# Patient Record
Sex: FEMALE | Race: WHITE | NOT HISPANIC OR LATINO | URBAN - METROPOLITAN AREA
[De-identification: names, ages, dates, MRNs, and addresses within clinical notes are randomized per-mention and may not be internally consistent; named-entity substitution may affect disease eponyms.]

---

## 2018-03-12 ENCOUNTER — OFFICE VISIT CONVERTED (OUTPATIENT)
Dept: ORTHOPEDIC SURGERY | Facility: CLINIC | Age: 33
End: 2018-03-12
Attending: ORTHOPAEDIC SURGERY

## 2018-03-26 ENCOUNTER — OFFICE VISIT CONVERTED (OUTPATIENT)
Dept: ORTHOPEDIC SURGERY | Facility: CLINIC | Age: 33
End: 2018-03-26
Attending: ORTHOPAEDIC SURGERY

## 2018-04-11 ENCOUNTER — OFFICE VISIT CONVERTED (OUTPATIENT)
Dept: ORTHOPEDIC SURGERY | Facility: CLINIC | Age: 33
End: 2018-04-11
Attending: ORTHOPAEDIC SURGERY

## 2018-05-02 ENCOUNTER — OFFICE VISIT CONVERTED (OUTPATIENT)
Dept: ORTHOPEDIC SURGERY | Facility: CLINIC | Age: 33
End: 2018-05-02
Attending: PHYSICIAN ASSISTANT

## 2021-04-05 ENCOUNTER — HOSPITAL ENCOUNTER (OUTPATIENT)
Dept: LAB | Facility: HOSPITAL | Age: 36
Discharge: HOME | End: 2021-04-05
Attending: PHYSICIAN ASSISTANT

## 2021-04-05 LAB
25(OH)D3 SERPL-MCNC: 22.1 NG/ML (ref 30–100)
ALBUMIN SERPL-MCNC: 4.3 G/DL (ref 3.5–5)
ALBUMIN/GLOB SERPL: 1.5 {RATIO} (ref 1.4–2.6)
ALP SERPL-CCNC: 39 U/L (ref 42–98)
ALT SERPL-CCNC: 9 U/L (ref 10–40)
ANION GAP SERPL CALC-SCNC: 15 MMOL/L (ref 8–19)
AST SERPL-CCNC: 12 U/L (ref 15–50)
BASOPHILS # BLD AUTO: 0.03 10*3/UL (ref 0–0.2)
BASOPHILS NFR BLD AUTO: 0.6 % (ref 0–3)
BILIRUB SERPL-MCNC: 0.94 MG/DL (ref 0.2–1.3)
BUN SERPL-MCNC: 11 MG/DL (ref 5–25)
BUN/CREAT SERPL: 13 {RATIO} (ref 6–20)
CALCIUM SERPL-MCNC: 9 MG/DL (ref 8.7–10.4)
CHLORIDE SERPL-SCNC: 107 MMOL/L (ref 99–111)
CHOLEST SERPL-MCNC: 159 MG/DL (ref 107–200)
CHOLEST/HDLC SERPL: 3.1 {RATIO} (ref 3–6)
CONV ABS IMM GRAN: 0.02 10*3/UL (ref 0–0.2)
CONV CO2: 20 MMOL/L (ref 22–32)
CONV IMMATURE GRAN: 0.4 % (ref 0–1.8)
CONV TOTAL PROTEIN: 7.2 G/DL (ref 6.3–8.2)
CREAT UR-MCNC: 0.82 MG/DL (ref 0.5–0.9)
DEPRECATED RDW RBC AUTO: 42.5 FL (ref 36.4–46.3)
EOSINOPHIL # BLD AUTO: 0.15 10*3/UL (ref 0–0.7)
EOSINOPHIL # BLD AUTO: 2.8 % (ref 0–7)
ERYTHROCYTE [DISTWIDTH] IN BLOOD BY AUTOMATED COUNT: 12.8 % (ref 11.7–14.4)
GFR SERPLBLD BASED ON 1.73 SQ M-ARVRAT: >60 ML/MIN/{1.73_M2}
GLOBULIN UR ELPH-MCNC: 2.9 G/DL (ref 2–3.5)
GLUCOSE SERPL-MCNC: 103 MG/DL (ref 65–99)
HCT VFR BLD AUTO: 42.1 % (ref 37–47)
HDLC SERPL-MCNC: 52 MG/DL (ref 40–60)
HGB BLD-MCNC: 13.7 G/DL (ref 12–16)
LDLC SERPL CALC-MCNC: 98 MG/DL (ref 70–100)
LYMPHOCYTES # BLD AUTO: 1.86 10*3/UL (ref 1–5)
LYMPHOCYTES NFR BLD AUTO: 34.9 % (ref 20–45)
MCH RBC QN AUTO: 30 PG (ref 27–31)
MCHC RBC AUTO-ENTMCNC: 32.5 G/DL (ref 33–37)
MCV RBC AUTO: 92.1 FL (ref 81–99)
MONOCYTES # BLD AUTO: 0.37 10*3/UL (ref 0.2–1.2)
MONOCYTES NFR BLD AUTO: 6.9 % (ref 3–10)
NEUTROPHILS # BLD AUTO: 2.9 10*3/UL (ref 2–8)
NEUTROPHILS NFR BLD AUTO: 54.4 % (ref 30–85)
NRBC CBCN: 0 % (ref 0–0.7)
OSMOLALITY SERPL CALC.SUM OF ELEC: 286 MOSM/KG (ref 273–304)
PLATELET # BLD AUTO: 169 10*3/UL (ref 130–400)
PMV BLD AUTO: 11.3 FL (ref 9.4–12.3)
POTASSIUM SERPL-SCNC: 4.3 MMOL/L (ref 3.5–5.3)
RBC # BLD AUTO: 4.57 10*6/UL (ref 4.2–5.4)
SODIUM SERPL-SCNC: 138 MMOL/L (ref 135–147)
TRIGL SERPL-MCNC: 44 MG/DL (ref 40–150)
TSH SERPL-ACNC: 1.44 M[IU]/L (ref 0.27–4.2)
VLDLC SERPL-MCNC: 9 MG/DL (ref 5–37)
WBC # BLD AUTO: 5.33 10*3/UL (ref 4.8–10.8)

## 2021-05-09 NOTE — H&P
"   History and Physical      Patient Name: Adriana Page   Patient ID: [id number]   Sex: Female   YOB: 1985        Visit Date: March 12, 2018    Provider: Miki Solomon MD   Location: [facility]   Location Address: [address] [place] [address]  [id number]   Location Phone: [phone]          Chief Complaint  · Right Wrist Injury      History Of Present Illness  Adriana Page is a 32 year old /White female who presents today to [facility] for evaluation of R wrist pain. She fell on an outstretched hand, injuring her R wrist and sustained a radial styloid fracture, nondisplaced, and sent for further evaluation.       Past Surgical History  Cesarian Section         Allergy List  NO KNOWN DRUG ALLERGIES; NO KNOWN DRUG ALLERGIES         Family Medical History  Cancer, Unspecified; Diabetes, unspecified type         Social History  Alcohol Use (Current some day); lives with children; lives with spouse; .; Recreational Drug Use (Never); Tobacco (Former); Working         Review of Systems  · Constitutional  o Denies  o : fever, chills, weight loss  · Cardiovascular  o Denies  o : chest pain, shortness of breath  · Gastrointestinal  o Denies  o : liver disease, heartburn, nausea, blood in stools  · Genitourinary  o Denies  o : painful urination, blood in urine  · Integument  o Denies  o : rash, itching  · Neurologic  o Denies  o : headache, weakness, loss of consciousness  · Musculoskeletal  o Denies  o : painful, swollen joints  · Psychiatric  o Denies  o : drug/alcohol addiction, anxiety, depression      Vitals  Date Time BP Position Site L\R Cuff Size HR RR TEMP(F) WT  HT  BMI kg/m2 BSA m2 O2 Sat HC       03/12/2018 09:51 AM         154lbs 0oz 5'  6\" 24.86 1.8            Physical Examination  · Constitutional  o Appearance  o : well developed, well-nourished, no obvious deformities present  · Head and Face  o Head  o :   § Inspection  § : " normocephalic  o Face  o :   § Inspection  § : no facial lesions  · Eyes  o Conjunctivae  o : conjunctivae normal  o Sclerae  o : sclerae white  · Ears, Nose, Mouth and Throat  o Ears  o :   § External Ears  § : appearance within normal limits  § Hearing  § : intact  o Nose  o :   § External Nose  § : appearance normal  · Neck  o Inspection/Palpation  o : normal appearance  o Range of Motion  o : full range of motion  · Respiratory  o Respiratory Effort  o : breathing unlabored  o Inspection of Chest  o : normal appearance  o Auscultation of Lungs  o : no audible wheezing or rales  · Cardiovascular  o Heart  o : regular rate  · Gastrointestinal  o Abdominal Examination  o : soft and non-tender  · Skin and Subcutaneous Tissue  o General Inspection  o : intact, no rashes  · Psychiatric  o General  o : Alert and oriented x3  o Judgement and Insight  o : judgment and insight intact  o Mood and Affect  o : mood normal, affect appropriate  · Right Wrist  o Inspection  o : Her wrist is swollen. Tenderness over radial styloid. Nontender elbow and shoulder. Neurovascularly intact to the hand. Sensation grossly intact. X-rays revealed a nondisplaced radial styloid fracture.  · Casting  o Extremity  o : right wrist, Short arm cast   o Procedure  o : Closed treatment was obtained and fiberglass cast was applied. The patient tolerated the procedure without any complications.          Assessment  · Right wrist pain     719.43/M25.531  · Closed nondisplaced fracture of styloid process of right radius, initial encounter     813.42/S52.514A      Plan  · Orders  o Cast application ([id number]) - - 03/12/2018  o Casting Supplies (Short Arm) Adult () - - 03/12/2018  · Instructions  o Reviewed the patient's Past Medical, Social, and Family history as well as the ROS at today's visit, no changes.  o Call or return if worsening symptoms.  o Follow back up and reXray in 2 weeks. She will need a total of 4-6 weeks in a  cast.            Electronically Signed by: Anita Hartman-, Other -Author on March 12, 2018 03:56:56 PM  Electronically Co-signed by: Miki Solomon MD -Reviewer on March 16, 2018 04:17:44 PM

## 2021-05-14 NOTE — PROGRESS NOTES
"   Progress Note      Patient Name: Adriana Page   Patient ID: [id number]   Sex: Female   YOB: 1985    Referring Provider: Miki Solomon MD    Visit Date: April 11, 2018    Provider: Miki Solomon MD   Location: [facility]   Location Address: [address] [place] [address]  [id number]   Location Phone: [phone]          Chief Complaint  · Right Radial Styloid Fracture      History Of Present Illness  Adriana Page is a 32 year old /White female who presents today to [facility] following up for a R radial styloid fracture sustained 5.5 weeks ago. Patient denies pain in the R wrist. Patient was in a short-arm cast. Patient states mild stiffness.       Past Medical History  Closed nondisplaced fracture of styloid process of right radius         Past Surgical History  Cesarian Section         Allergy List  NO KNOWN DRUG ALLERGIES         Family Medical History  Cancer, Unspecified; Diabetes, unspecified type         Social History  Alcohol Use (Current some day); lives with children; lives with spouse; .; Recreational Drug Use (Never); Tobacco (Former); Working         Review of Systems  · Constitutional  o Denies  o : fever, chills, weight loss  · Cardiovascular  o Denies  o : chest pain, shortness of breath  · Gastrointestinal  o Denies  o : liver disease, heartburn, nausea, blood in stools  · Genitourinary  o Denies  o : painful urination, blood in urine  · Integument  o Denies  o : rash, itching  · Neurologic  o Denies  o : headache, weakness, loss of consciousness  · Musculoskeletal  o Denies  o : painful, swollen joints  · Psychiatric  o Denies  o : drug/alcohol addiction, anxiety, depression      Vitals  Date Time BP Position Site L\R Cuff Size HR RR TEMP(F) WT  HT  BMI kg/m2 BSA m2 O2 Sat HC       04/11/2018 07:30 AM      62 - R   152lbs 8oz 5'  5\" 25.38 1.78 98 %           Physical Examination  · Constitutional  o Appearance  o : well developed, " well-nourished, no obvious deformities present  · Head and Face  o Head  o :   § Inspection  § : normocephalic  o Face  o :   § Inspection  § : no facial lesions  · Eyes  o Conjunctivae  o : conjunctivae normal  o Sclerae  o : sclerae white  · Ears, Nose, Mouth and Throat  o Ears  o :   § External Ears  § : appearance within normal limits  § Hearing  § : intact  o Nose  o :   § External Nose  § : appearance normal  · Neck  o Inspection/Palpation  o : normal appearance  o Range of Motion  o : full range of motion  · Respiratory  o Respiratory Effort  o : breathing unlabored  o Inspection of Chest  o : normal appearance  o Auscultation of Lungs  o : no audible wheezing or rales  · Cardiovascular  o Heart  o : regular rate  · Gastrointestinal  o Abdominal Examination  o : soft and non-tender  · Skin and Subcutaneous Tissue  o General Inspection  o : intact, no rashes  · Psychiatric  o General  o : Alert and oriented x3  o Judgement and Insight  o : judgment and insight intact  o Mood and Affect  o : mood normal, affect appropriate  · Right Wrist  o Inspection  o : No tenderness over distal radius; -5 degrees extension; -5 degrees flexion; -5 degrees supination; full pronation; able to make a fist. Neurovascularly grossly intact. Sensation grossly intact. Radial and ulnar pulses are 2+.   · In Office Procedures  o View  o : AP/LATERAL  o Site  o : right, wrist   o Indication  o : Right Radial Styloid Fracture  o Study  o : X-rays ordered, taken in the office, and reviewed today.  o Xray  o : Good healing of R radial styloid fracture.  o Comparative Data  o : Comparative Data found and reviewed today           Assessment  · Right Fracture: Wrist     814.01  · Closed nondisplaced fracture of styloid process of right radius     813.42/S52.514A      Plan  · Orders  o Wrist 2v (Right) [facility] ([id number]) - 814.01 - 04/11/2018  · Instructions  o Reviewed the patient's Past Medical, Social, and Family history as well as the ROS at  today's visit, no changes.  o Call or return if worsening symptoms.  o The above service was scribed by Ratna King on my behalf and I attest to the accuracy of the note. rebecca  o Continue to work on range of motion. Brace provided. Follow up 3 weeks.            Electronically Signed by: Anita Hartman-, Other -Author on April 12, 2018 04:16:43 PM  Electronically Co-signed by: Ratna King PA-C -Reviewer on April 13, 2018 07:45:32 AM  Electronically Co-signed by: Miki Solomon MD -Reviewer on April 16, 2018 09:28:52 AM

## 2021-05-14 NOTE — PROGRESS NOTES
"   Progress Note      Patient Name: Adriana Page   Patient ID: [id number]   Sex: Female   YOB: 1985        Visit Date: May 2, 2018    Provider: Ratna King PA-C   Location: [facility]   Location Address: [address] [place] [address]  [id number]   Location Phone: [phone]          Chief Complaint  · Right wrist pain      History Of Present Illness  Adriana Page is a 32 year old /White female who presents today to [facility].      Patient is following up for right radial styloid fracture sustained in February 2018. Patient states weakness in right wrist. Patient denies pain in right wrist.       Past Medical History  Closed nondisplaced fracture of styloid process of right radius         Past Surgical History  Cesarian Section         Allergy List  NO KNOWN DRUG ALLERGIES         Family Medical History  Cancer, Unspecified; Diabetes, unspecified type         Social History  Alcohol Use (Current some day); lives with children; lives with spouse; .; Recreational Drug Use (Never); Tobacco (Former); Working         Review of Systems  · Constitutional  o Denies  o : fever, chills, weight loss  · Cardiovascular  o Denies  o : chest pain, shortness of breath  · Gastrointestinal  o Denies  o : liver disease, heartburn, nausea, blood in stools  · Genitourinary  o Denies  o : painful urination, blood in urine  · Integument  o Denies  o : rash, itching  · Neurologic  o Denies  o : headache, weakness, loss of consciousness  · Musculoskeletal  o Denies  o : painful, swollen joints  · Psychiatric  o Denies  o : drug/alcohol addiction, anxiety, depression      Vitals  Date Time BP Position Site L\R Cuff Size HR RR TEMP(F) WT  HT  BMI kg/m2 BSA m2 O2 Sat HC       05/02/2018 07:23 AM      67 - R   146lbs 0oz 5'  6\" 23.56 1.76           Physical Examination  · Constitutional  o Appearance  o : well developed, well-nourished, no obvious deformities " present  · Head and Face  o Head  o :   § Inspection  § : normocephalic  o Face  o :   § Inspection  § : no facial lesions  · Eyes  o Conjunctivae  o : conjunctivae normal  o Sclerae  o : sclerae white  · Ears, Nose, Mouth and Throat  o Ears  o :   § External Ears  § : appearance within normal limits  § Hearing  § : intact  o Nose  o :   § External Nose  § : appearance normal  · Neck  o Inspection/Palpation  o : normal appearance  o Range of Motion  o : full range of motion  · Respiratory  o Respiratory Effort  o : breathing unlabored  o Inspection of Chest  o : normal appearance  o Auscultation of Lungs  o : no audible wheezing or rales  · Cardiovascular  o Heart  o : regular rate  · Gastrointestinal  o Abdominal Examination  o : soft and non-tender  · Skin and Subcutaneous Tissue  o General Inspection  o : intact, no rashes  · Psychiatric  o General  o : Alert and oriented x3  o Judgement and Insight  o : judgment and insight intact  o Mood and Affect  o : mood normal, affect appropriate  · In Office Procedures  o View  o : AP/LATERAL  o Site  o : right, wrist   o Indication  o : Right wrist pain  o Study  o : X-rays ordered, taken in the office, and reviewed today.  o Xray  o : good healing of radius  o Comparative Data  o : Comparative Data found and reviewed today   · Right Hand-Street  o Inspection  o : No swelling, no erythema, no ecchymosis, no thenar atrophy, no mallet deformity, no boutonniere deformity, no heberden's nodes, no marcos's nodes  o Palpation  o : No tenderness at distal radius, no tenderness at distal ulna, no tenderness anatomic snuffbox, no tenderness at scaphoid tubercle, tenderness at TFCC, no tenderness of ulnar collateral ligament, no tenderness at metacarpal, tenderness at PIP/DIP joint  o ROM  o : Full wrist extension, full wrist flexion, full ulnar deviation, full raidal deviation, full MCP/PIP/DIP flexion, full MCP/PIP/DIP Extension, full opposition  o Strength  o : full wrist  extension, full wrist flexion, full , full thumb oppostion, full PIP flexors, full DIP flexors, full PIP extensors, full finger adduction, full finger abduction  o Special Tests  o : Negative phalen's test, negative tinel's test, negative finkelstein's test, negative kimbrough's test, negative loaded circumduction test, negative compression test, negative shift test  o Neurovascular  o : Full sensation, radial pulse 2+, ulnar pulse 2+          Assessment  · Right wrist pain     719.43/M25.531  · Radial styloid fracture     813.42/S52.513A      Plan  · Orders  o Wrist (Right) 2 views X-Ray [facility] ([id number]) - 719.43/M25.531 - 05/02/2018  · Instructions  o Reviewed the patient's Past Medical, Social, and Family history as well as the ROS at today's visit, no changes.  o Call or return if worsening symptoms.  o Exercise handout given.  o X-ray ordered, taken and reviewed at this visit.  o Follow Up PRN.            Electronically Signed by: LUIS Meier-DEMARCUS -Author on May 2, 2018 07:38:23 AM  Electronically Co-signed by: Miki Solomon MD -Reviewer on May 4, 2018 11:17:34 AM

## 2021-05-14 NOTE — PROGRESS NOTES
"   Progress Note      Patient Name: Adriana Page   Patient ID: [id number]   Sex: Female   YOB: 1985        Visit Date: March 26, 2018    Provider: Miki Solomon MD   Location: [facility]   Location Address: [address] [place] [address]  [id number]   Location Phone: [phone]          Chief Complaint  · Right Radial Styloid Fracture      History Of Present Illness  Adriana Page is a 32 year old /White female who presents today to [facility] following up for a R radial styloid fracture. She is two weeks out.       Past Medical History  Closed nondisplaced fracture of styloid process of right radius         Past Surgical History  Cesarian Section         Allergy List  NO KNOWN DRUG ALLERGIES; NO KNOWN DRUG ALLERGIES         Family Medical History  Cancer, Unspecified; Diabetes, unspecified type         Social History  Alcohol Use (Current some day); lives with children; lives with spouse; .; Recreational Drug Use (Never); Tobacco (Former); Working         Review of Systems  · Constitutional  o Denies  o : fever, chills, weight loss  · Cardiovascular  o Denies  o : chest pain, shortness of breath  · Gastrointestinal  o Denies  o : liver disease, heartburn, nausea, blood in stools  · Genitourinary  o Denies  o : painful urination, blood in urine  · Integument  o Denies  o : rash, itching  · Neurologic  o Denies  o : headache, weakness, loss of consciousness  · Musculoskeletal  o Denies  o : painful, swollen joints  · Psychiatric  o Denies  o : drug/alcohol addiction, anxiety, depression      Vitals  Date Time BP Position Site L\R Cuff Size HR RR TEMP(F) WT  HT  BMI kg/m2 BSA m2 O2 Sat        03/26/2018 03:26 PM      64 - R   127lbs 8oz 5'  6\" 20.58 1.64 99 %          Physical Examination  · Constitutional  o Appearance  o : well developed, well-nourished, no obvious deformities present  · Head and Face  o Head  o :   § Inspection  § : " normocephalic  o Face  o :   § Inspection  § : no facial lesions  · Eyes  o Conjunctivae  o : conjunctivae normal  o Sclerae  o : sclerae white  · Ears, Nose, Mouth and Throat  o Ears  o :   § External Ears  § : appearance within normal limits  § Hearing  § : intact  o Nose  o :   § External Nose  § : appearance normal  · Neck  o Inspection/Palpation  o : normal appearance  o Range of Motion  o : full range of motion  · Respiratory  o Respiratory Effort  o : breathing unlabored  o Inspection of Chest  o : normal appearance  o Auscultation of Lungs  o : no audible wheezing or rales  · Cardiovascular  o Heart  o : regular rate  · Gastrointestinal  o Abdominal Examination  o : soft and non-tender  · Skin and Subcutaneous Tissue  o General Inspection  o : intact, no rashes  · Psychiatric  o General  o : Alert and oriented x3  o Judgement and Insight  o : judgment and insight intact  o Mood and Affect  o : mood normal, affect appropriate  · Right Wrist  o Inspection  o : Cast is well-fitting. X-rays reveal no change.  · In Office Procedures  o View  o : AP/LATERAL  o Site  o : right, wrist   o Indication  o : Right wrist fracture  o Study  o : X-rays ordered, taken in the office, and reviewed today.  o Xray  o : Xrays in the cast reveal no change.  o Comparative Data  o : Comparative Data found and reviewed today           Assessment  · Right wrist pain     719.43/M25.531  · Closed nondisplaced fracture of styloid process of right radius     813.42/S52.514A      Plan  · Orders  o Wrist (Right) 2 views X-Ray [facility] ([id number]) - 719.43/M25.531 - 03/26/2018  · Instructions  o Reviewed the patient's Past Medical, Social, and Family history as well as the ROS at today's visit, no changes.  o Call or return if worsening symptoms.  o Continue the cast. ReXray out of cast in 2-3 weeks.            Electronically Signed by: Anita Hartman-, Other -Author on March 28, 2018 11:57:02 AM  Electronically Co-signed  by: Miki Solomon MD -Reviewer on March 29, 2018 10:52:16 AM

## 2021-05-16 VITALS — HEART RATE: 67 BPM | HEIGHT: 66 IN | WEIGHT: 146 LBS | BODY MASS INDEX: 23.46 KG/M2

## 2021-05-16 VITALS — HEART RATE: 64 BPM | OXYGEN SATURATION: 99 % | BODY MASS INDEX: 20.49 KG/M2 | HEIGHT: 66 IN | WEIGHT: 127.5 LBS

## 2021-05-16 VITALS — OXYGEN SATURATION: 98 % | BODY MASS INDEX: 25.41 KG/M2 | WEIGHT: 152.5 LBS | HEIGHT: 65 IN | HEART RATE: 62 BPM

## 2021-05-16 VITALS — WEIGHT: 154 LBS | BODY MASS INDEX: 24.75 KG/M2 | HEIGHT: 66 IN

## 2023-08-25 ENCOUNTER — OFFICE VISIT (OUTPATIENT)
Dept: INTERNAL MEDICINE | Facility: CLINIC | Age: 38
End: 2023-08-25
Payer: COMMERCIAL

## 2023-08-25 VITALS
OXYGEN SATURATION: 98 % | DIASTOLIC BLOOD PRESSURE: 70 MMHG | BODY MASS INDEX: 27.87 KG/M2 | HEIGHT: 66 IN | WEIGHT: 173.4 LBS | TEMPERATURE: 98.6 F | SYSTOLIC BLOOD PRESSURE: 112 MMHG | RESPIRATION RATE: 18 BRPM | HEART RATE: 70 BPM

## 2023-08-25 DIAGNOSIS — Z01.89 ROUTINE LAB DRAW: ICD-10-CM

## 2023-08-25 DIAGNOSIS — E55.9 VITAMIN D DEFICIENCY: ICD-10-CM

## 2023-08-25 DIAGNOSIS — Z76.89 ENCOUNTER TO ESTABLISH CARE: Primary | ICD-10-CM

## 2023-08-25 DIAGNOSIS — E66.3 OVERWEIGHT (BMI 25.0-29.9): ICD-10-CM

## 2023-08-25 DIAGNOSIS — Z13.220 SCREENING FOR LIPID DISORDERS: ICD-10-CM

## 2023-08-25 NOTE — PROGRESS NOTES
"Chief Complaint  Establish Care (37 year old female here today to establish care. ) and Weight Check (She would like to discuss weight management )    History of Present Illness  SUBJECTIVE  Adriana Page presents to [facility] to establish care.     Pap-several years  Tobacco use-denies  Alcohol use-not frequently.  She is a teacher.    Denies any other issues or concerns at this time. She denies any chest pain, palpitations, nausea, vomiting, diarrhea, concerns with bowel/bladder function.    Past Medical History:   Diagnosis Date    Fatigue     Impaired fasting glucose     Vitamin D deficiency     Weight loss       Family History   Problem Relation Age of Onset    Cancer Mother     Diabetes Father       Past Surgical History:   Procedure Laterality Date     SECTION      x2      No current outpatient medications on file.    OBJECTIVE  Vital Signs:   /70 (BP Location: Left arm, Patient Position: Sitting)   Pulse 70   Temp 98.6 øF (37 øC) (Temporal)   Resp 18   Ht 167.6 cm (66\")   Wt 78.7 kg (173 lb 6.4 oz)   SpO2 98%   BMI 27.99 kg/mý    Estimated body mass index is 27.99 kg/mý as calculated from the following:    Height as of this encounter: 167.6 cm (66\").    Weight as of this encounter: 78.7 kg (173 lb 6.4 oz).     Wt Readings from Last 3 Encounters:   23 78.7 kg (173 lb 6.4 oz)   18 66.2 kg (146 lb)   18 69.2 kg (152 lb 8 oz)     BP Readings from Last 3 Encounters:   23 112/70       Physical Exam  Vitals and nursing note reviewed.   Constitutional:       Appearance: Normal appearance.   HENT:      Head: Normocephalic.   Eyes:      Extraocular Movements: Extraocular movements intact.      Conjunctiva/sclera: Conjunctivae normal.   Cardiovascular:      Rate and Rhythm: Normal rate and regular rhythm.      Heart sounds: Normal heart sounds. No murmur heard.  Pulmonary:      Effort: Pulmonary effort is normal.      Breath " sounds: Normal breath sounds. No wheezing or rales.   Abdominal:      General: Bowel sounds are normal.      Palpations: Abdomen is soft.      Tenderness: There is no abdominal tenderness. There is no guarding.   Musculoskeletal:         General: No swelling. Normal range of motion.   Skin:     General: Skin is warm and dry.   Neurological:      General: No focal deficit present.      Mental Status: She is alert and oriented to person, place, and time. Mental status is at baseline.   Psychiatric:         Mood and Affect: Mood normal.         Behavior: Behavior normal.         Thought Content: Thought content normal.         Judgment: Judgment normal.        Result Review        No Images in the past 120 days found..     The above data has been reviewed by FERCHO Murphy 08/25/2023 13:23 EDT.          Patient Care Team:  Eri Ferraro APRN as PCP - General (Internal Medicine)        ASSESSMENT & PLAN    Diagnoses and all orders for this visit:    1. Encounter to establish care (Primary)    2. Overweight (BMI 25.0-29.9)  Assessment & Plan:  Patient's (Body mass index is 27.99 kg/mý.) indicates that they are overweight with health conditions that include none . Weight is unchanged. BMI is is above average; BMI management plan is completed. We discussed portion control and increasing exercise.     Orders:  -     Vitamin B12 & Folate; Future    3. Vitamin D deficiency  -     Vitamin D,25-Hydroxy; Future    4. Routine lab draw  -     CBC w AUTO Differential; Future  -     Comprehensive metabolic panel; Future  -     TSH+Free T4; Future  -     Vitamin B12 & Folate; Future    5. Screening for lipid disorders  -     Lipid panel; Future         Tobacco Use: Medium Risk    Smoking Tobacco Use: Former    Smokeless Tobacco Use: Unknown    Passive Exposure: Not on file       Follow Up     Return in about 2 weeks (around 9/8/2023) for Annual physical with PAP.    Please note that portions of this note were completed with a  voice recognition program.    Patient was given instructions and counseling regarding her condition or for health maintenance advice. Please see specific information pulled into the AVS if appropriate.   I have reviewed information obtained and documented by others and I have confirmed the accuracy of this documented note.    FERCHO Murphy

## 2023-08-25 NOTE — ASSESSMENT & PLAN NOTE
Patient's (Body mass index is 27.99 kg/mý.) indicates that they are overweight with health conditions that include none . Weight is unchanged. BMI is is above average; BMI management plan is completed. We discussed portion control and increasing exercise.

## 2023-09-05 ENCOUNTER — LAB (OUTPATIENT)
Dept: LAB | Facility: HOSPITAL | Age: 38
End: 2023-09-05
Payer: COMMERCIAL

## 2023-09-05 DIAGNOSIS — E55.9 VITAMIN D DEFICIENCY: ICD-10-CM

## 2023-09-05 DIAGNOSIS — E66.3 OVERWEIGHT (BMI 25.0-29.9): ICD-10-CM

## 2023-09-05 DIAGNOSIS — Z13.220 SCREENING FOR LIPID DISORDERS: ICD-10-CM

## 2023-09-05 DIAGNOSIS — Z01.89 ROUTINE LAB DRAW: ICD-10-CM

## 2023-09-05 LAB
25(OH)D3 SERPL-MCNC: 22.8 NG/ML (ref 30–100)
ALBUMIN SERPL-MCNC: 4.5 G/DL (ref 3.5–5.2)
ALBUMIN/GLOB SERPL: 1.6 G/DL
ALP SERPL-CCNC: 52 U/L (ref 39–117)
ALT SERPL W P-5'-P-CCNC: 10 U/L (ref 1–33)
ANION GAP SERPL CALCULATED.3IONS-SCNC: 9 MMOL/L (ref 5–15)
AST SERPL-CCNC: 10 U/L (ref 1–32)
BASOPHILS # BLD AUTO: 0.02 10*3/MM3 (ref 0–0.2)
BASOPHILS NFR BLD AUTO: 0.3 % (ref 0–1.5)
BILIRUB SERPL-MCNC: 1.1 MG/DL (ref 0–1.2)
BUN SERPL-MCNC: 15 MG/DL (ref 6–20)
BUN/CREAT SERPL: 18.5 (ref 7–25)
CALCIUM SPEC-SCNC: 9.2 MG/DL (ref 8.6–10.5)
CHLORIDE SERPL-SCNC: 105 MMOL/L (ref 98–107)
CHOLEST SERPL-MCNC: 192 MG/DL (ref 0–200)
CO2 SERPL-SCNC: 23 MMOL/L (ref 22–29)
CREAT SERPL-MCNC: 0.81 MG/DL (ref 0.57–1)
DEPRECATED RDW RBC AUTO: 39.1 FL (ref 37–54)
EGFRCR SERPLBLD CKD-EPI 2021: 97.2 ML/MIN/1.73
EOSINOPHIL # BLD AUTO: 0.12 10*3/MM3 (ref 0–0.4)
EOSINOPHIL NFR BLD AUTO: 1.9 % (ref 0.3–6.2)
ERYTHROCYTE [DISTWIDTH] IN BLOOD BY AUTOMATED COUNT: 12.1 % (ref 12.3–15.4)
FOLATE SERPL-MCNC: 6.44 NG/ML (ref 4.78–24.2)
GLOBULIN UR ELPH-MCNC: 2.8 GM/DL
GLUCOSE SERPL-MCNC: 93 MG/DL (ref 65–99)
HCT VFR BLD AUTO: 41.8 % (ref 34–46.6)
HDLC SERPL-MCNC: 47 MG/DL (ref 40–60)
HGB BLD-MCNC: 14.4 G/DL (ref 12–15.9)
IMM GRANULOCYTES # BLD AUTO: 0.04 10*3/MM3 (ref 0–0.05)
IMM GRANULOCYTES NFR BLD AUTO: 0.6 % (ref 0–0.5)
LDLC SERPL CALC-MCNC: 132 MG/DL (ref 0–100)
LDLC/HDLC SERPL: 2.79 {RATIO}
LYMPHOCYTES # BLD AUTO: 1.9 10*3/MM3 (ref 0.7–3.1)
LYMPHOCYTES NFR BLD AUTO: 30.3 % (ref 19.6–45.3)
MCH RBC QN AUTO: 31 PG (ref 26.6–33)
MCHC RBC AUTO-ENTMCNC: 34.4 G/DL (ref 31.5–35.7)
MCV RBC AUTO: 89.9 FL (ref 79–97)
MONOCYTES # BLD AUTO: 0.55 10*3/MM3 (ref 0.1–0.9)
MONOCYTES NFR BLD AUTO: 8.8 % (ref 5–12)
NEUTROPHILS NFR BLD AUTO: 3.65 10*3/MM3 (ref 1.7–7)
NEUTROPHILS NFR BLD AUTO: 58.1 % (ref 42.7–76)
NRBC BLD AUTO-RTO: 0 /100 WBC (ref 0–0.2)
PLATELET # BLD AUTO: 190 10*3/MM3 (ref 140–450)
PMV BLD AUTO: 10.9 FL (ref 6–12)
POTASSIUM SERPL-SCNC: 4.1 MMOL/L (ref 3.5–5.2)
PROT SERPL-MCNC: 7.3 G/DL (ref 6–8.5)
RBC # BLD AUTO: 4.65 10*6/MM3 (ref 3.77–5.28)
SODIUM SERPL-SCNC: 137 MMOL/L (ref 136–145)
T4 FREE SERPL-MCNC: 1.25 NG/DL (ref 0.93–1.7)
TRIGL SERPL-MCNC: 69 MG/DL (ref 0–150)
TSH SERPL DL<=0.05 MIU/L-ACNC: 3.02 UIU/ML (ref 0.27–4.2)
VIT B12 BLD-MCNC: 353 PG/ML (ref 211–946)
VLDLC SERPL-MCNC: 13 MG/DL (ref 5–40)
WBC NRBC COR # BLD: 6.28 10*3/MM3 (ref 3.4–10.8)

## 2023-09-05 PROCEDURE — 80061 LIPID PANEL: CPT

## 2023-09-05 PROCEDURE — 82607 VITAMIN B-12: CPT

## 2023-09-05 PROCEDURE — 80050 GENERAL HEALTH PANEL: CPT

## 2023-09-05 PROCEDURE — 84439 ASSAY OF FREE THYROXINE: CPT

## 2023-09-05 PROCEDURE — 82306 VITAMIN D 25 HYDROXY: CPT

## 2023-09-05 PROCEDURE — 36415 COLL VENOUS BLD VENIPUNCTURE: CPT

## 2023-09-05 PROCEDURE — 82746 ASSAY OF FOLIC ACID SERUM: CPT

## 2023-09-08 ENCOUNTER — OFFICE VISIT (OUTPATIENT)
Dept: INTERNAL MEDICINE | Facility: CLINIC | Age: 38
End: 2023-09-08
Payer: COMMERCIAL

## 2023-09-08 VITALS
TEMPERATURE: 98.2 F | BODY MASS INDEX: 27.74 KG/M2 | WEIGHT: 172.6 LBS | SYSTOLIC BLOOD PRESSURE: 118 MMHG | HEIGHT: 66 IN | DIASTOLIC BLOOD PRESSURE: 62 MMHG | HEART RATE: 88 BPM | RESPIRATION RATE: 16 BRPM | OXYGEN SATURATION: 99 %

## 2023-09-08 DIAGNOSIS — E66.3 OVERWEIGHT (BMI 25.0-29.9): ICD-10-CM

## 2023-09-08 DIAGNOSIS — Z23 NEED FOR TDAP VACCINATION: ICD-10-CM

## 2023-09-08 DIAGNOSIS — Z00.00 ANNUAL PHYSICAL EXAM: Primary | ICD-10-CM

## 2023-09-08 DIAGNOSIS — E55.9 VITAMIN D DEFICIENCY: ICD-10-CM

## 2023-09-08 RX ORDER — ERGOCALCIFEROL 1.25 MG/1
50000 CAPSULE ORAL WEEKLY
Qty: 12 CAPSULE | Refills: 1 | Status: SHIPPED | OUTPATIENT
Start: 2023-09-08

## 2023-09-08 NOTE — PROGRESS NOTES
"Chief Complaint  Hyperlipidemia and Follow-up (36 yo female is here today for a 2 week follow up to discuss lab results and lipid levels. Discuss weight loss and underlying issues. )    History of Present Illness  SUBJECTIVE  Adriana Page presents to [facility] for her annual physical exam.        Past Medical History:   Diagnosis Date    Fatigue     Impaired fasting glucose     Vitamin D deficiency     Weight loss       Family History   Problem Relation Age of Onset    Cancer Mother     Diabetes Father       Past Surgical History:   Procedure Laterality Date     SECTION      x2        Current Outpatient Medications:     Semaglutide-Weight Management 0.25 MG/0.5ML solution auto-injector, Inject 0.25 mg under the skin into the appropriate area as directed 1 (One) Time Per Week., Disp: 2 mL, Rfl: 0    vitamin D (ERGOCALCIFEROL) 1.25 MG (22302 UT) capsule capsule, Take 1 capsule by mouth 1 (One) Time Per Week., Disp: 12 capsule, Rfl: 1    OBJECTIVE  Vital Signs:   /62 (BP Location: Right arm, Patient Position: Sitting, Cuff Size: Large Adult)   Pulse 88   Temp 98.2 °F (36.8 °C) (Skin)   Resp 16   Ht 167.6 cm (66\")   Wt 78.3 kg (172 lb 9.6 oz)   SpO2 99%   BMI 27.86 kg/m²    Estimated body mass index is 27.86 kg/m² as calculated from the following:    Height as of this encounter: 167.6 cm (66\").    Weight as of this encounter: 78.3 kg (172 lb 9.6 oz).     Wt Readings from Last 3 Encounters:   23 78.3 kg (172 lb 9.6 oz)   23 78.7 kg (173 lb 6.4 oz)   18 66.2 kg (146 lb)     BP Readings from Last 3 Encounters:   23 118/62   23 112/70       Physical Exam  Vitals and nursing note reviewed.   Constitutional:       Appearance: Normal appearance.   HENT:      Head: Normocephalic.      Right Ear: Tympanic membrane normal.      Left Ear: Tympanic membrane normal.   Eyes:      Extraocular Movements: Extraocular movements intact.      " Conjunctiva/sclera: Conjunctivae normal.   Cardiovascular:      Rate and Rhythm: Normal rate and regular rhythm.      Heart sounds: Normal heart sounds. No murmur heard.  Pulmonary:      Effort: Pulmonary effort is normal.      Breath sounds: Normal breath sounds. No wheezing or rales.   Abdominal:      General: Bowel sounds are normal.      Palpations: Abdomen is soft.      Tenderness: There is no abdominal tenderness. There is no guarding.   Musculoskeletal:         General: No swelling. Normal range of motion.      Cervical back: Normal range of motion and neck supple.   Skin:     General: Skin is warm and dry.   Neurological:      General: No focal deficit present.      Mental Status: She is alert and oriented to person, place, and time. Mental status is at baseline.   Psychiatric:         Mood and Affect: Mood normal.         Behavior: Behavior normal.         Thought Content: Thought content normal.         Judgment: Judgment normal.        Result Review    CMP          9/5/2023    06:43   CMP   Glucose 93    BUN 15    Creatinine 0.81    EGFR 97.2    Sodium 137    Potassium 4.1    Chloride 105    Calcium 9.2    Total Protein 7.3    Albumin 4.5    Globulin 2.8    Total Bilirubin 1.1    Alkaline Phosphatase 52    AST (SGOT) 10    ALT (SGPT) 10    Albumin/Globulin Ratio 1.6    BUN/Creatinine Ratio 18.5    Anion Gap 9.0      CBC w/diff          9/5/2023    06:43   CBC w/Diff   WBC 6.28    RBC 4.65    Hemoglobin 14.4    Hematocrit 41.8    MCV 89.9    MCH 31.0    MCHC 34.4    RDW 12.1    Platelets 190    Neutrophil Rel % 58.1    Immature Granulocyte Rel % 0.6    Lymphocyte Rel % 30.3    Monocyte Rel % 8.8    Eosinophil Rel % 1.9    Basophil Rel % 0.3      Lipid Panel          9/5/2023    06:43   Lipid Panel   Total Cholesterol 192    Triglycerides 69    HDL Cholesterol 47    VLDL Cholesterol 13    LDL Cholesterol  132    LDL/HDL Ratio 2.79      TSH          9/5/2023    06:43   TSH   TSH 3.020      Lab Results    Component Value Date    GGAC36KG 22.8 (L) 09/05/2023     Lab Results   Component Value Date    FREET4 1.25 09/05/2023     Lab Results   Component Value Date    YUZEAQQD92 353 09/05/2023     No results found for: RBCUA, WBCUA, BACTERIA, SQUAMEPIUA, HYALCASTU, METHODOLOGY, COLORU, CLARITYU, PHUR, SPECGRAVUR, GLUCOSEU, KETONESU, BILIRUBINUR, BLOODU, PROTEINUA, LEUKOCYTESUR, NITRITEU, UROBILINOGEN    No Images in the past 120 days found..     The above data has been reviewed by FERCHO Murphy 09/08/2023 15:47 EDT.          Patient Care Team:  Eri Ferraro APRN as PCP - General (Internal Medicine)            ASSESSMENT & PLAN    Diagnoses and all orders for this visit:    1. Annual physical exam (Primary)  Assessment & Plan:  Monthly SBE counseled  PAP-several years. counseled  Flu vaccine-counseled  Wears seat belt.  TDAP-due  Recommend regular dental/eye exams, regular exercise, healthy diet.      2. Overweight (BMI 25.0-29.9)  Assessment & Plan:  Patient's (Body mass index is 27.86 kg/m².) indicates that they are overweight with health conditions that include depression, especially due to her weight.  We discussed options.   At this point, patient would like to try pharmacological option-we will start Wegovy 0.25mg weekly.   Risks/benefits discussed with patient.     Orders:  -     Semaglutide-Weight Management 0.25 MG/0.5ML solution auto-injector; Inject 0.25 mg under the skin into the appropriate area as directed 1 (One) Time Per Week.  Dispense: 2 mL; Refill: 0    3. Vitamin D deficiency  -     vitamin D (ERGOCALCIFEROL) 1.25 MG (48032 UT) capsule capsule; Take 1 capsule by mouth 1 (One) Time Per Week.  Dispense: 12 capsule; Refill: 1    4. Need for Tdap vaccination  -     Tdap Vaccine => 6yo IM (BOOSTRIX)         Tobacco Use: Medium Risk    Smoking Tobacco Use: Former    Smokeless Tobacco Use: Never    Passive Exposure: Not on file       Follow Up     Return in about 6 weeks (around 10/20/2023) for  Recheck.    Please note that portions of this note were completed with a voice recognition program.    Patient was given instructions and counseling regarding her condition or for health maintenance advice. Please see specific information pulled into the AVS if appropriate.   I have reviewed information obtained and documented by others and I have confirmed the accuracy of this documented note.    FERCHO Murphy

## 2023-09-08 NOTE — ASSESSMENT & PLAN NOTE
Patient's (Body mass index is 27.86 kg/m².) indicates that they are overweight with health conditions that include depression, especially due to her weight.  We discussed options.   At this point, patient would like to try pharmacological option-we will start Wegovy 0.25mg weekly.   Risks/benefits discussed with patient.

## 2023-09-08 NOTE — ASSESSMENT & PLAN NOTE
Monthly SBE counseled  PAP-several years. counseled  Flu vaccine-counseled  Wears seat belt.  TDAP-due  Recommend regular dental/eye exams, regular exercise, healthy diet.

## 2023-09-14 ENCOUNTER — TELEPHONE (OUTPATIENT)
Dept: INTERNAL MEDICINE | Facility: CLINIC | Age: 38
End: 2023-09-14
Payer: COMMERCIAL

## 2023-09-14 NOTE — TELEPHONE ENCOUNTER
Pa on wegovy denied because weight loss medications are not covered under her insurance please advise

## 2023-09-15 NOTE — TELEPHONE ENCOUNTER
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 Can you find out if she is on her own insurance or her husbands? If she is on her husbands then let her know it is denied.

## 2023-11-01 ENCOUNTER — TELEPHONE (OUTPATIENT)
Dept: INTERNAL MEDICINE | Facility: CLINIC | Age: 38
End: 2023-11-01
Payer: COMMERCIAL

## 2023-11-01 DIAGNOSIS — E66.3 OVERWEIGHT (BMI 25.0-29.9): Primary | ICD-10-CM

## 2023-11-01 NOTE — TELEPHONE ENCOUNTER
Pt wasn't able to get the wegoy , was told a alternative weight loss med was suppose to be sent ( phentermine )    Pt hasn't received any medication and asking why.     Please advise     Pt would like a call  back

## 2023-11-02 RX ORDER — PHENTERMINE HYDROCHLORIDE 15 MG/1
15 CAPSULE ORAL EVERY MORNING
Qty: 30 CAPSULE | Refills: 0 | Status: SHIPPED | OUTPATIENT
Start: 2023-11-02

## 2023-11-02 NOTE — TELEPHONE ENCOUNTER
My apologies, I guess I did not realize it was not sent in.  I have sent in phentermine 15 mg once daily.  She will take it first thing in the morning.  I already discussed with her the risk and benefits.  If she develops any palpitations agitation, insomnia or any other issues please have her call the office.  She does need a follow-up appointment in 1 month with me.  Thank you

## 2024-03-13 ENCOUNTER — TELEPHONE (OUTPATIENT)
Dept: INTERNAL MEDICINE | Age: 39
End: 2024-03-13

## 2024-03-13 DIAGNOSIS — E66.3 OVERWEIGHT (BMI 25.0-29.9): ICD-10-CM

## 2024-03-13 RX ORDER — PHENTERMINE HYDROCHLORIDE 15 MG/1
15 CAPSULE ORAL EVERY MORNING
Qty: 30 CAPSULE | Refills: 0 | Status: CANCELLED | OUTPATIENT
Start: 2024-03-13

## 2024-03-13 NOTE — TELEPHONE ENCOUNTER
Controlled Med- PT last seen in Sept 2023  Please advise         Rx Refill Note  Requested Prescriptions     Pending Prescriptions Disp Refills    phentermine 15 MG capsule 30 capsule 0     Sig: Take 1 capsule by mouth Every Morning.      Last office visit with prescribing clinician: 9/8/2023   Last telemedicine visit with prescribing clinician: Visit date not found   Next office visit with prescribing clinician: Visit date not found                         Would you like a call back once the refill request has been completed: [] Yes [] No    If the office needs to give you a call back, can they leave a voicemail: [] Yes [] No    Ludivina Malloy MA  03/13/24, 13:25 EDT

## 2024-03-13 NOTE — TELEPHONE ENCOUNTER
Caller: Camilo Adriana Kay    Relationship: Self    Best call back number: [phone]    Requested Prescriptions:   Requested Prescriptions     Pending Prescriptions Disp Refills    phentermine 15 MG capsule 30 capsule 0     Sig: Take 1 capsule by mouth Every Morning.        Pharmacy where request should be sent: [facility] [id number]  [place] - [address] AT [facility] [address] - [phone] [facility] [phone] FX     Last office visit with prescribing clinician: 9/8/2023   Last telemedicine visit with prescribing clinician: Visit date not found   Next office visit with prescribing clinician: Visit date not found       Does the patient have less than a 3 day supply:  [x] Yes  [] No    Would you like a call back once the refill request has been completed: [] Yes [] No    If the office needs to give you a call back, can they leave a voicemail: [] Yes [] No    Kate Baxter Rep   03/13/24 08:38 EDT

## 2024-04-04 ENCOUNTER — OFFICE VISIT (OUTPATIENT)
Dept: INTERNAL MEDICINE | Age: 39
End: 2024-04-04
Payer: COMMERCIAL

## 2024-04-04 VITALS
BODY MASS INDEX: 29.09 KG/M2 | HEART RATE: 67 BPM | HEIGHT: 65 IN | RESPIRATION RATE: 16 BRPM | DIASTOLIC BLOOD PRESSURE: 82 MMHG | OXYGEN SATURATION: 100 % | TEMPERATURE: 97.4 F | WEIGHT: 174.6 LBS | SYSTOLIC BLOOD PRESSURE: 118 MMHG

## 2024-04-04 DIAGNOSIS — E55.9 VITAMIN D DEFICIENCY: ICD-10-CM

## 2024-04-04 DIAGNOSIS — E66.3 OVERWEIGHT (BMI 25.0-29.9): Primary | ICD-10-CM

## 2024-04-04 RX ORDER — PHENTERMINE HYDROCHLORIDE 15 MG/1
15 CAPSULE ORAL EVERY MORNING
Qty: 30 CAPSULE | Refills: 0 | Status: SHIPPED | OUTPATIENT
Start: 2024-04-04

## 2024-04-04 RX ORDER — ERGOCALCIFEROL 1.25 MG/1
50000 CAPSULE ORAL WEEKLY
Qty: 12 CAPSULE | Refills: 1 | Status: SHIPPED | OUTPATIENT
Start: 2024-04-04

## 2024-04-04 NOTE — PROGRESS NOTES
"Chief Complaint  Weight Gain (Would like to go back on Phentermine. )    History of Present Illness  SUBJECTIVE  Adriana Page presents to [facility] to discuss resuming phentermine.       Past Medical History:   Diagnosis Date    Fatigue     Impaired fasting glucose     Vitamin D deficiency     Weight loss       Family History   Problem Relation Age of Onset    Cancer Mother     Diabetes Father       Past Surgical History:   Procedure Laterality Date     SECTION      x2        Current Outpatient Medications:     phentermine 15 MG capsule, Take 1 capsule by mouth Every Morning., Disp: 30 capsule, Rfl: 0    vitamin D (ERGOCALCIFEROL) 1.25 MG (63301 UT) capsule capsule, Take 1 capsule by mouth 1 (One) Time Per Week., Disp: 12 capsule, Rfl: 1    OBJECTIVE  Vital Signs:   /82 (BP Location: Right arm, Patient Position: Sitting, Cuff Size: Large Adult)   Pulse 67   Temp 97.4 °F (36.3 °C) (Temporal)   Resp 16   Ht 165.1 cm (65\")   Wt 79.2 kg (174 lb 9.6 oz)   SpO2 100%   BMI 29.05 kg/m²    Estimated body mass index is 29.05 kg/m² as calculated from the following:    Height as of this encounter: 165.1 cm (65\").    Weight as of this encounter: 79.2 kg (174 lb 9.6 oz).     Wt Readings from Last 3 Encounters:   24 79.2 kg (174 lb 9.6 oz)   23 78.3 kg (172 lb 9.6 oz)   23 78.7 kg (173 lb 6.4 oz)     BP Readings from Last 3 Encounters:   24 118/82   23 118/62   23 112/70       Physical Exam  Vitals and nursing note reviewed.   Constitutional:       Appearance: Normal appearance.   HENT:      Head: Normocephalic.   Eyes:      Extraocular Movements: Extraocular movements intact.      Conjunctiva/sclera: Conjunctivae normal.   Cardiovascular:      Rate and Rhythm: Normal rate and regular rhythm.      Heart sounds: Normal heart sounds. No murmur heard.  Pulmonary:      Effort: Pulmonary effort is normal.      Breath sounds: Normal " breath sounds. No wheezing or rales.   Abdominal:      General: Bowel sounds are normal.      Palpations: Abdomen is soft.      Tenderness: There is no abdominal tenderness. There is no guarding.   Musculoskeletal:         General: No swelling. Normal range of motion.   Skin:     General: Skin is warm and dry.   Neurological:      General: No focal deficit present.      Mental Status: She is alert. Mental status is at baseline.   Psychiatric:         Mood and Affect: Mood normal.         Thought Content: Thought content normal.          Result Review        No Images in the past 120 days found..     The above data has been reviewed by FERCHO Murphy 04/04/2024 13:02 EDT.          Patient Care Team:  Eri Ferraro APRN as PCP - General (Internal Medicine)            ASSESSMENT & PLAN    Diagnoses and all orders for this visit:    1. Overweight (BMI 25.0-29.9) (Primary)  Assessment & Plan:  Patient's (Body mass index is 29.05 kg/m².) indicates that they are overweight with health conditions that include depression . Weight is worsening. BMI is is above average; BMI management plan is completed. We discussed portion control and increasing exercise. Patient started on phentermine in November for weight loss and it did help with her appetite. She denies any other side effects other than dry mouth. She tolerated phentermine well-denies any palpitations, soa, chest discomfort. She would like to restart it.  We will restart her at phentermine 15 mg qam.  Follow up in 1 month    Orders:  -     phentermine 15 MG capsule; Take 1 capsule by mouth Every Morning.  Dispense: 30 capsule; Refill: 0    2. Vitamin D deficiency  -     vitamin D (ERGOCALCIFEROL) 1.25 MG (79675 UT) capsule capsule; Take 1 capsule by mouth 1 (One) Time Per Week.  Dispense: 12 capsule; Refill: 1         Tobacco Use: Medium Risk (4/4/2024)    Patient History     Smoking Tobacco Use: Former     Smokeless Tobacco Use: Never     Passive Exposure: Not on  file       Follow Up     Return in about 4 weeks (around 5/2/2024) for Video visit, Recheck.    Please note that portions of this note were completed with a voice recognition program.    Patient was given instructions and counseling regarding her condition or for health maintenance advice. Please see specific information pulled into the AVS if appropriate.   I have reviewed information obtained and documented by others and I have confirmed the accuracy of this documented note.    FERCHO Murphy

## 2024-04-04 NOTE — ASSESSMENT & PLAN NOTE
Patient's (Body mass index is 29.05 kg/m².) indicates that they are overweight with health conditions that include depression . Weight is worsening. BMI is is above average; BMI management plan is completed. We discussed portion control and increasing exercise. Patient started on phentermine in November for weight loss and it did help with her appetite. She denies any other side effects other than dry mouth. She tolerated phentermine well-denies any palpitations, soa, chest discomfort. She would like to restart it.  We will restart her at phentermine 15 mg qam.  Follow up in 1 month

## 2024-05-03 ENCOUNTER — TELEMEDICINE (OUTPATIENT)
Dept: INTERNAL MEDICINE | Age: 39
End: 2024-05-03
Payer: COMMERCIAL

## 2024-05-03 VITALS — HEIGHT: 65 IN | WEIGHT: 163 LBS | BODY MASS INDEX: 27.16 KG/M2

## 2024-05-03 DIAGNOSIS — E66.3 OVERWEIGHT (BMI 25.0-29.9): Primary | ICD-10-CM

## 2024-05-03 PROCEDURE — 99213 OFFICE O/P EST LOW 20 MIN: CPT | Performed by: NURSE PRACTITIONER

## 2024-05-03 RX ORDER — PHENTERMINE HYDROCHLORIDE 37.5 MG/1
37.5 CAPSULE ORAL EVERY MORNING
Qty: 30 CAPSULE | Refills: 0 | Status: SHIPPED | OUTPATIENT
Start: 2024-05-03

## 2024-05-03 NOTE — ASSESSMENT & PLAN NOTE
Patient's (Body mass index is 27.12 kg/m².) indicates that they are overweight with health conditions that include none . Weight is improving with treatment. BMI is is above average; BMI management plan is completed. We discussed low calorie, low carb based diet program, portion control, increasing exercise, and an riccardo-based approach such as Cleversafeness Pal or Lose It.

## 2024-05-03 NOTE — PROGRESS NOTES
"Chief Complaint  Weight loss f/u    Mode of Visit: Video  Location of patient: Home  Location of provider: [facility]  [address]  [place] [address]  Dept: [phone]  Dept Fax: [phone]  You have chosen to receive care through a telehealth visit.  Does the patient consent to use a video/audio connection for your medical care today? YES  The visit included audio and video interaction. No technical issues occurred during this visit.          Subjective          Adriana Page presents to [facility] for weight medication follow up.   Weight today is at 163. She denies any headaches, chest pain or palpitations.   States she doesn't feel like medication is working as well as it did before. She is trying to watch diet. Has been a little more active lately with school activities but no formal exercise. Planning to get back into a routine once school is out for summer.          History of Present Illness    Current Outpatient Medications   Medication Instructions    phentermine 37.5 mg, Oral, Every Morning    vitamin D (ERGOCALCIFEROL) 50,000 Units, Oral, Weekly       The following portions of the patient's history were reviewed and updated as appropriate: allergies, current medications, past family history, past medical history, past social history, past surgical history, and problem list.    Objective   Vital Signs:   Ht 165.1 cm (65\")   Wt 73.9 kg (163 lb) Comment: per home scale  BMI 27.12 kg/m²     Wt Readings from Last 3 Encounters:   05/03/24 73.9 kg (163 lb)   04/04/24 79.2 kg (174 lb 9.6 oz)   09/08/23 78.3 kg (172 lb 9.6 oz)     BP Readings from Last 3 Encounters:   04/04/24 118/82   09/08/23 118/62   08/25/23 112/70     Physical Exam  Constitutional:       Appearance: Normal appearance.   HENT:      Head: Normocephalic and atraumatic.   Eyes:      Conjunctiva/sclera: " "Conjunctivae normal.      Pupils: Pupils are equal, round, and reactive to light.   Pulmonary:      Effort: Pulmonary effort is normal.   Neurological:      Mental Status: She is alert and oriented to person, place, and time.   Psychiatric:         Mood and Affect: Mood normal.         Behavior: Behavior normal.          Result Review :  The following data was reviewed by: FERCHO Pepper on 05/03/2024:      Common labs          9/5/2023    06:43   Common Labs   Glucose 93    BUN 15    Creatinine 0.81    Sodium 137    Potassium 4.1    Chloride 105    Calcium 9.2    Albumin 4.5    Total Bilirubin 1.1    Alkaline Phosphatase 52    AST (SGOT) 10    ALT (SGPT) 10    WBC 6.28    Hemoglobin 14.4    Hematocrit 41.8    Platelets 190    Total Cholesterol 192    Triglycerides 69    HDL Cholesterol 47    LDL Cholesterol  132        Lab Results (last 72 hours)       ** No results found for the last 72 hours. **             No Images in the past 120 days found..    No results found for: \"SARSANTIGEN\", \"COVID19\", \"RAPFLUA\", \"RAPFLUB\", \"FLUAAG\", \"FLUABDAG\", \"FLU\", \"FLUBAG\", \"RAPSCRN\", \"STREPAAG\", \"RSV\", \"POCPREGUR\", \"MONOSPOT\", \"INR\", \"LEADCAPBLD\", \"POCLEAD\", \"BILIRUBINUR\", \"POCGLU\"    Procedures        Assessment and Plan   Diagnoses and all orders for this visit:    1. Overweight (BMI 25.0-29.9) (Primary)  Assessment & Plan:  Patient's (Body mass index is 27.12 kg/m².) indicates that they are overweight with health conditions that include none . Weight is improving with treatment. BMI is is above average; BMI management plan is completed. We discussed low calorie, low carb based diet program, portion control, increasing exercise, and an riccardo-based approach such as [employer] Pal or Lose It.     Orders:  -     phentermine 37.5 MG capsule; Take 1 capsule by mouth Every Morning.  Dispense: 30 capsule; Refill: 0                  Medications Discontinued During This Encounter   Medication Reason    phentermine 15 MG capsule  "          Follow Up   Return in about 4 weeks (around 5/31/2024) for Video visit.  Patient was given instructions and counseling regarding her condition or for health maintenance advice. Please see specific information pulled into the AVS if appropriate.       FERCHO Pepper  05/03/24  09:10 EDT

## 2024-06-04 DIAGNOSIS — E66.3 OVERWEIGHT (BMI 25.0-29.9): ICD-10-CM

## 2024-06-04 NOTE — TELEPHONE ENCOUNTER
Caller: Bruna Pageline Bita    Relationship: Self    Best call back number:     [phone]     Requested Prescriptions:   Requested Prescriptions     Pending Prescriptions Disp Refills    phentermine 37.5 MG capsule 30 capsule 0     Sig: Take 1 capsule by mouth Every Morning.      Pharmacy where request should be sent: [facility] [id number]  [place] - [address] AT [facility] [address] - [phone] [facility] [phone]      Last office visit with prescribing clinician: 4/4/2024   Last telemedicine visit with prescribing clinician: 5/3/2024   Next office visit with prescribing clinician: Visit date not found     Additional details provided by patient: PATIENT STATES THAT SHE WOULD LIKE A CALLBACK IF SHE NEEDS TO BE SEEN FOR THIS REFILL.     Does the patient have less than a 3 day supply:  [x] Yes  [] No    Would you like a call back once the refill request has been completed: [] Yes [] No    If the office needs to give you a call back, can they leave a voicemail: [] Yes [] No    Kate Floyd Rep   06/04/24 08:56 EDT

## 2024-06-07 NOTE — TELEPHONE ENCOUNTER
Caller: Adriana Page    Relationship to patient: Self    Best call back number: [phone]     Patient is needing: PATIENT IS REQUESTING AN UPDATE.    PLEASE CALL TO ADVISE.         REYNAHART NO CALL PREFERRED MAY LEAVE VOICEMAIL.

## 2024-06-08 RX ORDER — PHENTERMINE HYDROCHLORIDE 37.5 MG/1
37.5 CAPSULE ORAL EVERY MORNING
Qty: 30 CAPSULE | Refills: 0 | OUTPATIENT
Start: 2024-06-08

## 2024-06-11 ENCOUNTER — TELEMEDICINE (OUTPATIENT)
Dept: INTERNAL MEDICINE | Age: 39
End: 2024-06-11
Payer: COMMERCIAL

## 2024-06-11 VITALS — BODY MASS INDEX: 25.49 KG/M2 | HEIGHT: 65 IN | WEIGHT: 153 LBS

## 2024-06-11 DIAGNOSIS — E66.3 OVERWEIGHT (BMI 25.0-29.9): ICD-10-CM

## 2024-06-11 PROCEDURE — 99213 OFFICE O/P EST LOW 20 MIN: CPT | Performed by: NURSE PRACTITIONER

## 2024-06-11 RX ORDER — PHENTERMINE HYDROCHLORIDE 37.5 MG/1
37.5 CAPSULE ORAL EVERY MORNING
Qty: 30 CAPSULE | Refills: 0 | Status: SHIPPED | OUTPATIENT
Start: 2024-06-11

## 2024-06-11 NOTE — PROGRESS NOTES
"Chief Complaint  Weight Loss (38 year old female for a follow up on weight loss and medication refill. States no side effects. States she is currently 153 pounds. )      Mode of Visit: Video  Location of patient: Home  Location of provider: [facility]  [address] [place] [address] [phone]  [place] [id number]  Dept: [phone]  Dept Fax: [phone]  You have chosen to receive care through a telehealth visit.  Does the patient consent to use a video/audio connection for your medical care today? YES  The visit included audio and video interaction. No technical issues occurred during this visit.      Subjective      Adriana Page is a 38-year-old female that presents to [facility] for 1 month follow up on phentermine.  She states she is doing well and denies any chest pain, headaches or dizziness.  States that she has been walking and getting more active since the weather has been nice.  Is currently down between 8-10 more pounds from her last visit.  We did increase the phentermine to 37.5 mg last month.  She has tolerated the dose increase without any issues.    History of Present Illness    Current Outpatient Medications   Medication Instructions    phentermine 37.5 mg, Oral, Every Morning    vitamin D (ERGOCALCIFEROL) 50,000 Units, Oral, Weekly       The following portions of the patient's history were reviewed and updated as appropriate: allergies, current medications, past family history, past medical history, past social history, past surgical history, and problem list.    Objective   Vital Signs:   Ht 165.1 cm (65\")   Wt 69.4 kg (153 lb)   BMI 25.46 kg/m²     Wt Readings from Last 3 Encounters:   06/11/24 69.4 kg (153 lb)   05/03/24 73.9 kg (163 lb)   04/04/24 79.2 kg (174 lb 9.6 oz)     BP Readings from Last 3 Encounters:   04/04/24 118/82   09/08/23 118/62   08/25/23 112/70     Physical " "Exam  Constitutional:       Appearance: Normal appearance.   Pulmonary:      Effort: Pulmonary effort is normal.   Neurological:      Mental Status: She is alert and oriented to person, place, and time.   Psychiatric:         Mood and Affect: Mood normal.         Behavior: Behavior normal.          Result Review :  The following data was reviewed by: FERCHO Pepper on 06/11/2024:      Common labs          9/5/2023    06:43   Common Labs   Glucose 93    BUN 15    Creatinine 0.81    Sodium 137    Potassium 4.1    Chloride 105    Calcium 9.2    Albumin 4.5    Total Bilirubin 1.1    Alkaline Phosphatase 52    AST (SGOT) 10    ALT (SGPT) 10    WBC 6.28    Hemoglobin 14.4    Hematocrit 41.8    Platelets 190    Total Cholesterol 192    Triglycerides 69    HDL Cholesterol 47    LDL Cholesterol  132        Lab Results (last 72 hours)       ** No results found for the last 72 hours. **             No Images in the past 120 days found..    No results found for: \"SARSANTIGEN\", \"COVID19\", \"RAPFLUA\", \"RAPFLUB\", \"FLUAAG\", \"FLUABDAG\", \"FLU\", \"FLUBAG\", \"RAPSCRN\", \"STREPAAG\", \"RSV\", \"POCPREGUR\", \"MONOSPOT\", \"INR\", \"LEADCAPBLD\", \"POCLEAD\", \"BILIRUBINUR\", \"POCGLU\"    Procedures        Assessment and Plan   Diagnoses and all orders for this visit:    1. Overweight (BMI 25.0-29.9)  Comments:  Improving.  Current BMI is 25.4.  Continue with current regimen.  Orders:  -     phentermine 37.5 MG capsule; Take 1 capsule by mouth Every Morning.  Dispense: 30 capsule; Refill: 0                  Medications Discontinued During This Encounter   Medication Reason    phentermine 37.5 MG capsule Reorder          Follow Up   No follow-ups on file.  Patient was given instructions and counseling regarding her condition or for health maintenance advice. Please see specific information pulled into the AVS if appropriate.       FERCHO Pepper  06/11/24  14:23 EDT              "

## 2025-06-24 ENCOUNTER — OFFICE VISIT (OUTPATIENT)
Dept: INTERNAL MEDICINE | Age: 40
End: 2025-06-24
Payer: COMMERCIAL

## 2025-06-24 VITALS
WEIGHT: 177.6 LBS | SYSTOLIC BLOOD PRESSURE: 100 MMHG | HEIGHT: 65 IN | BODY MASS INDEX: 29.59 KG/M2 | OXYGEN SATURATION: 99 % | HEART RATE: 75 BPM | DIASTOLIC BLOOD PRESSURE: 80 MMHG

## 2025-06-24 DIAGNOSIS — Z13.220 SCREENING FOR LIPID DISORDERS: ICD-10-CM

## 2025-06-24 DIAGNOSIS — E55.9 VITAMIN D DEFICIENCY: ICD-10-CM

## 2025-06-24 DIAGNOSIS — Z01.89 ROUTINE LAB DRAW: ICD-10-CM

## 2025-06-24 DIAGNOSIS — E66.3 OVERWEIGHT (BMI 25.0-29.9): Primary | ICD-10-CM

## 2025-06-24 LAB
AMPHET+METHAMPHET UR QL: NEGATIVE
AMPHETAMINE INTERNAL CONTROL: NORMAL
AMPHETAMINES UR QL: NEGATIVE
BARBITURATE INTERNAL CONTROL: NORMAL
BARBITURATES UR QL SCN: NEGATIVE
BENZODIAZ UR QL SCN: NEGATIVE
BENZODIAZEPINE INTERNAL CONTROL: NORMAL
BUPRENORPHINE INTERNAL CONTROL: NORMAL
BUPRENORPHINE SERPL-MCNC: NEGATIVE NG/ML
CANNABINOIDS SERPL QL: NEGATIVE
COCAINE INTERNAL CONTROL: NORMAL
COCAINE UR QL: NEGATIVE
EXPIRATION DATE: NORMAL
Lab: NORMAL
MDMA (ECSTASY) INTERNAL CONTROL: NORMAL
MDMA UR QL SCN: NEGATIVE
METHADONE INTERNAL CONTROL: NORMAL
METHADONE UR QL SCN: NEGATIVE
METHAMPHETAMINE INTERNAL CONTROL: NORMAL
MORPHINE INTERNAL CONTROL: NORMAL
MORPHINE/OPIATES SCREEN, URINE: NEGATIVE
OXYCODONE INTERNAL CONTROL: NORMAL
OXYCODONE UR QL SCN: NEGATIVE
PCP UR QL SCN: NEGATIVE
PHENCYCLIDINE INTERNAL CONTROL: NORMAL
THC INTERNAL CONTROL: NORMAL

## 2025-06-24 PROCEDURE — 99214 OFFICE O/P EST MOD 30 MIN: CPT

## 2025-06-24 RX ORDER — PHENTERMINE HYDROCHLORIDE 37.5 MG/1
37.5 CAPSULE ORAL EVERY MORNING
Qty: 30 CAPSULE | Refills: 0 | Status: SHIPPED | OUTPATIENT
Start: 2025-06-24

## 2025-06-24 NOTE — PROGRESS NOTES
"Chief Complaint  Weight Loss (Pt would like to start weight loss medication )    Weight Loss    SUBJECTIVE  Adriana Page presents to [facility]     History of Present Illness  The patient presents for weight loss.    She has been actively trying to lose weight for the past 3 weeks but feels she is not making progress. She recalls that a medication prescribed approximately 1.5 years ago was beneficial in initiating her weight loss journey and expresses interest in resuming this treatment for a few months to aid her current efforts. She reports no issues with capsule intake. Despite maintaining a balanced diet and regular exercise, she finds it increasingly difficult to shed pounds as she ages.    SOCIAL HISTORY  She is an eighth-.      Past Medical History:   Diagnosis Date    Fatigue     Impaired fasting glucose     Vitamin D deficiency     Weight loss       Family History   Problem Relation Age of Onset    Cancer Mother     Diabetes Father       Past Surgical History:   Procedure Laterality Date     SECTION      x2        Current Outpatient Medications:     vitamin D (ERGOCALCIFEROL) 1.25 MG (87079 UT) capsule capsule, Take 1 capsule by mouth 1 (One) Time Per Week., Disp: 12 capsule, Rfl: 1    phentermine 37.5 MG capsule, Take 1 capsule by mouth Every Morning., Disp: 30 capsule, Rfl: 0    OBJECTIVE  Vital Signs:   /80   Pulse 75   Ht 165.1 cm (65\")   Wt 80.6 kg (177 lb 9.6 oz)   SpO2 99%   BMI 29.55 kg/m²    Estimated body mass index is 29.55 kg/m² as calculated from the following:    Height as of this encounter: 165.1 cm (65\").    Weight as of this encounter: 80.6 kg (177 lb 9.6 oz).     Wt Readings from Last 3 Encounters:   25 80.6 kg (177 lb 9.6 oz)   24 69.4 kg (153 lb)   24 73.9 kg (163 lb)     BP Readings from Last 3 Encounters:   25 100/80   24 118/82   23 118/62       Physical Exam  Vitals and " nursing note reviewed.   Constitutional:       Appearance: Normal appearance.   HENT:      Head: Normocephalic.   Eyes:      Extraocular Movements: Extraocular movements intact.      Conjunctiva/sclera: Conjunctivae normal.   Cardiovascular:      Rate and Rhythm: Normal rate and regular rhythm.      Heart sounds: Normal heart sounds. No murmur heard.  Pulmonary:      Effort: Pulmonary effort is normal.      Breath sounds: Normal breath sounds. No wheezing or rales.   Abdominal:      General: Bowel sounds are normal.      Palpations: Abdomen is soft.      Tenderness: There is no abdominal tenderness. There is no guarding.   Musculoskeletal:         General: No swelling. Normal range of motion.      Cervical back: Normal range of motion and neck supple.   Skin:     General: Skin is warm and dry.   Neurological:      General: No focal deficit present.      Mental Status: She is alert and oriented to person, place, and time. Mental status is at baseline.   Psychiatric:         Mood and Affect: Mood normal.         Behavior: Behavior normal.         Thought Content: Thought content normal.         Judgment: Judgment normal.          Result Review        No Images in the past 120 days found..     The above data has been reviewed by FERCHO Murphy 06/24/2025 10:52 EDT.          Patient Care Team:  Eri Ferraro APRN as PCP - General (Internal Medicine)    BMI is >= 25 and <30. (Overweight) The following options were offered after discussion;: exercise counseling/recommendations and nutrition counseling/recommendations       ASSESSMENT & PLAN    Diagnoses and all orders for this visit:    1. Overweight (BMI 25.0-29.9) (Primary)  Assessment & Plan:  Patient's (Body mass index is 29.55 kg/m².) indicates that they are overweight with health conditions that include osteoarthritis . Weight is worsening. BMI is above average; BMI management plan is completed. We discussed portion control and increasing exercise.   She has  taken phentermine before and done well in the past  We will restart phentermine  Also recommend increasing activity, healthier diet     Orders:  -     phentermine 37.5 MG capsule; Take 1 capsule by mouth Every Morning.  Dispense: 30 capsule; Refill: 0  -     POC Medline 12 Panel Urine Drug Screen    2. Vitamin D deficiency  -     Vitamin D,25-Hydroxy; Future    3. Routine lab draw  -     CBC & Differential; Future  -     Comprehensive Metabolic Panel; Future  -     TSH Rfx On Abnormal To Free T4; Future  -     Vitamin B12 & Folate; Future    4. Screening for lipid disorders  -     Lipid Panel; Future         Assessment & Plan  1. Weight management.  - Patient reports difficulty losing weight despite efforts over the past 3 weeks.  - Previous medication was effective in jump-starting weight loss.  - Blood work orders placed; urine drug screen required for controlled substance compliance.  - Phentermine prescribed; patient advised to report palpitations or irregular heartbeats.    Follow-up  The patient will follow up in 1 month.      Tobacco Use: Medium Risk (6/24/2025)    Patient History     Smoking Tobacco Use: Former     Smokeless Tobacco Use: Never     Passive Exposure: Not on file       Follow Up     Return in about 4 weeks (around 7/22/2025) for Annual physical.    Please note that portions of this note were completed with a voice recognition program.    Patient was given instructions and counseling regarding her condition or for health maintenance advice. Please see specific information pulled into the AVS if appropriate.   I have reviewed information obtained and documented by others and I have confirmed the accuracy of this documented note.    FERCHO Murphy    Patient or patient representative verbalized consent for the use of Ambient Listening during the visit with  FERCHO Murphy for chart documentation. 6/24/2025  12:54 EDT

## 2025-06-24 NOTE — ASSESSMENT & PLAN NOTE
Patient's (Body mass index is 29.55 kg/m².) indicates that they are overweight with health conditions that include osteoarthritis . Weight is worsening. BMI is above average; BMI management plan is completed. We discussed portion control and increasing exercise.   She has taken phentermine before and done well in the past  We will restart phentermine  Also recommend increasing activity, healthier diet

## 2025-07-19 ENCOUNTER — LAB (OUTPATIENT)
Facility: HOSPITAL | Age: 40
End: 2025-07-19
Payer: COMMERCIAL

## 2025-07-19 DIAGNOSIS — E55.9 VITAMIN D DEFICIENCY: ICD-10-CM

## 2025-07-19 DIAGNOSIS — Z13.220 SCREENING FOR LIPID DISORDERS: ICD-10-CM

## 2025-07-19 DIAGNOSIS — Z01.89 ROUTINE LAB DRAW: ICD-10-CM

## 2025-07-19 LAB
25(OH)D3 SERPL-MCNC: 35.1 NG/ML (ref 30–100)
ALBUMIN SERPL-MCNC: 4.3 G/DL (ref 3.5–5.2)
ALBUMIN/GLOB SERPL: 1.4 G/DL
ALP SERPL-CCNC: 49 U/L (ref 39–117)
ALT SERPL W P-5'-P-CCNC: 8 U/L (ref 1–33)
ANION GAP SERPL CALCULATED.3IONS-SCNC: 11 MMOL/L (ref 5–15)
AST SERPL-CCNC: 18 U/L (ref 1–32)
BASOPHILS # BLD AUTO: 0.02 10*3/MM3 (ref 0–0.2)
BASOPHILS NFR BLD AUTO: 0.4 % (ref 0–1.5)
BILIRUB SERPL-MCNC: 1.1 MG/DL (ref 0–1.2)
BUN SERPL-MCNC: 5 MG/DL (ref 6–20)
BUN/CREAT SERPL: 6.8 (ref 7–25)
CALCIUM SPEC-SCNC: 9 MG/DL (ref 8.6–10.5)
CHLORIDE SERPL-SCNC: 105 MMOL/L (ref 98–107)
CHOLEST SERPL-MCNC: 156 MG/DL (ref 0–200)
CO2 SERPL-SCNC: 21 MMOL/L (ref 22–29)
CREAT SERPL-MCNC: 0.74 MG/DL (ref 0.57–1)
DEPRECATED RDW RBC AUTO: 40.1 FL (ref 37–54)
EGFRCR SERPLBLD CKD-EPI 2021: 107 ML/MIN/1.73
EOSINOPHIL # BLD AUTO: 0.11 10*3/MM3 (ref 0–0.4)
EOSINOPHIL NFR BLD AUTO: 2.2 % (ref 0.3–6.2)
ERYTHROCYTE [DISTWIDTH] IN BLOOD BY AUTOMATED COUNT: 12 % (ref 12.3–15.4)
FOLATE SERPL-MCNC: 9.29 NG/ML (ref 4.78–24.2)
GLOBULIN UR ELPH-MCNC: 3.1 GM/DL
GLUCOSE SERPL-MCNC: 97 MG/DL (ref 65–99)
HCT VFR BLD AUTO: 41 % (ref 34–46.6)
HDLC SERPL-MCNC: 38 MG/DL (ref 40–60)
HGB BLD-MCNC: 13.7 G/DL (ref 12–15.9)
IMM GRANULOCYTES # BLD AUTO: 0.02 10*3/MM3 (ref 0–0.05)
IMM GRANULOCYTES NFR BLD AUTO: 0.4 % (ref 0–0.5)
LDLC SERPL CALC-MCNC: 106 MG/DL (ref 0–100)
LDLC/HDLC SERPL: 2.79 {RATIO}
LYMPHOCYTES # BLD AUTO: 1.64 10*3/MM3 (ref 0.7–3.1)
LYMPHOCYTES NFR BLD AUTO: 32.9 % (ref 19.6–45.3)
MCH RBC QN AUTO: 30.2 PG (ref 26.6–33)
MCHC RBC AUTO-ENTMCNC: 33.4 G/DL (ref 31.5–35.7)
MCV RBC AUTO: 90.3 FL (ref 79–97)
MONOCYTES # BLD AUTO: 0.32 10*3/MM3 (ref 0.1–0.9)
MONOCYTES NFR BLD AUTO: 6.4 % (ref 5–12)
NEUTROPHILS NFR BLD AUTO: 2.88 10*3/MM3 (ref 1.7–7)
NEUTROPHILS NFR BLD AUTO: 57.7 % (ref 42.7–76)
NRBC BLD AUTO-RTO: 0 /100 WBC (ref 0–0.2)
PLATELET # BLD AUTO: 239 10*3/MM3 (ref 140–450)
PMV BLD AUTO: 11.1 FL (ref 6–12)
POTASSIUM SERPL-SCNC: 3.9 MMOL/L (ref 3.5–5.2)
PROT SERPL-MCNC: 7.4 G/DL (ref 6–8.5)
RBC # BLD AUTO: 4.54 10*6/MM3 (ref 3.77–5.28)
SODIUM SERPL-SCNC: 137 MMOL/L (ref 136–145)
TRIGL SERPL-MCNC: 60 MG/DL (ref 0–150)
TSH SERPL DL<=0.05 MIU/L-ACNC: 2.09 UIU/ML (ref 0.27–4.2)
VIT B12 BLD-MCNC: 534 PG/ML (ref 211–946)
VLDLC SERPL-MCNC: 12 MG/DL (ref 5–40)
WBC NRBC COR # BLD AUTO: 4.99 10*3/MM3 (ref 3.4–10.8)

## 2025-07-19 PROCEDURE — 80061 LIPID PANEL: CPT

## 2025-07-19 PROCEDURE — 80050 GENERAL HEALTH PANEL: CPT

## 2025-07-19 PROCEDURE — 82306 VITAMIN D 25 HYDROXY: CPT

## 2025-07-19 PROCEDURE — 82746 ASSAY OF FOLIC ACID SERUM: CPT

## 2025-07-19 PROCEDURE — 36415 COLL VENOUS BLD VENIPUNCTURE: CPT

## 2025-07-19 PROCEDURE — 82607 VITAMIN B-12: CPT

## 2025-07-22 ENCOUNTER — OFFICE VISIT (OUTPATIENT)
Dept: INTERNAL MEDICINE | Age: 40
End: 2025-07-22
Payer: COMMERCIAL

## 2025-07-22 VITALS
DIASTOLIC BLOOD PRESSURE: 70 MMHG | OXYGEN SATURATION: 98 % | WEIGHT: 161.8 LBS | HEIGHT: 65 IN | SYSTOLIC BLOOD PRESSURE: 100 MMHG | BODY MASS INDEX: 26.96 KG/M2 | HEART RATE: 77 BPM | TEMPERATURE: 97.9 F

## 2025-07-22 DIAGNOSIS — E66.3 OVERWEIGHT (BMI 25.0-29.9): ICD-10-CM

## 2025-07-22 DIAGNOSIS — E55.9 VITAMIN D DEFICIENCY: ICD-10-CM

## 2025-07-22 DIAGNOSIS — Z00.00 ANNUAL PHYSICAL EXAM: Primary | ICD-10-CM

## 2025-07-22 PROCEDURE — 99395 PREV VISIT EST AGE 18-39: CPT

## 2025-07-22 RX ORDER — PHENTERMINE HYDROCHLORIDE 37.5 MG/1
37.5 CAPSULE ORAL EVERY MORNING
Qty: 30 CAPSULE | Refills: 0 | Status: SHIPPED | OUTPATIENT
Start: 2025-07-22

## 2025-07-22 RX ORDER — ERGOCALCIFEROL 1.25 MG/1
50000 CAPSULE, LIQUID FILLED ORAL WEEKLY
Qty: 12 CAPSULE | Refills: 3 | Status: SHIPPED | OUTPATIENT
Start: 2025-07-22

## 2025-07-22 NOTE — ASSESSMENT & PLAN NOTE
Recommend monthly SBE, mammograms start at age 40  PAP-patient is scheduled  Recommend age related vaccinations  Wears seat belt.  Recommend regular dental and eye exams, healthy diet and regular exercise.

## 2025-07-22 NOTE — PROGRESS NOTES
"Chief Complaint  Annual Exam (The patient is coming in for an annual physical, labs done. The patient has no concerns. )    History of Present Illness  SUBJECTIVE  Adriana Page presents to [facility]   History of Present Illness  The patient presents for weight loss.    She has experienced a weight loss of 16 pounds within a month, which she attributes to her recent vacation. She reports feeling well overall and notes that the medication she is taking has increased her energy levels, allowing her to complete her daily tasks. She is currently on phentermine and requires a refill.    She has not taken vitamin D in a while.    She has no issues or concerns. She has a Pap smear scheduled. She gets regular dental and eye exams. She wears her seatbelt.    FAMILY HISTORY  She does not have a family history of breast cancer or colon cancer.      Past Medical History:   Diagnosis Date    Anxiety     Fatigue     Impaired fasting glucose     Vitamin D deficiency     Weight loss       Family History   Problem Relation Age of Onset    Cancer Mother     Diabetes Father       Past Surgical History:   Procedure Laterality Date     SECTION      x2        Current Outpatient Medications:     phentermine 37.5 MG capsule, Take 1 capsule by mouth Every Morning., Disp: 30 capsule, Rfl: 0    vitamin D (ERGOCALCIFEROL) 1.25 MG (91218 UT) capsule capsule, Take 1 capsule by mouth 1 (One) Time Per Week., Disp: 12 capsule, Rfl: 3    OBJECTIVE  Vital Signs:   /70 (BP Location: Left arm, Patient Position: Sitting, Cuff Size: Large Adult)   Pulse 77   Temp 97.9 °F (36.6 °C) (Temporal)   Ht 165.1 cm (65\")   Wt 73.4 kg (161 lb 12.8 oz)   SpO2 98%   BMI 26.92 kg/m²    Estimated body mass index is 26.92 kg/m² as calculated from the following:    Height as of this encounter: 165.1 cm (65\").    Weight as of this encounter: 73.4 kg (161 lb 12.8 oz).     Wt Readings from Last 3 Encounters: "   07/22/25 73.4 kg (161 lb 12.8 oz)   06/24/25 80.6 kg (177 lb 9.6 oz)   06/11/24 69.4 kg (153 lb)     BP Readings from Last 3 Encounters:   07/22/25 100/70   06/24/25 100/80   04/04/24 118/82       Physical Exam  Vitals and nursing note reviewed.   Constitutional:       Appearance: Normal appearance.   HENT:      Head: Normocephalic.      Right Ear: Tympanic membrane normal.      Left Ear: Tympanic membrane normal.   Eyes:      Extraocular Movements: Extraocular movements intact.      Conjunctiva/sclera: Conjunctivae normal.   Cardiovascular:      Rate and Rhythm: Normal rate and regular rhythm.      Heart sounds: Normal heart sounds. No murmur heard.  Pulmonary:      Effort: Pulmonary effort is normal.      Breath sounds: Normal breath sounds. No wheezing or rales.   Abdominal:      General: Bowel sounds are normal.      Palpations: Abdomen is soft.      Tenderness: There is no abdominal tenderness. There is no guarding.   Musculoskeletal:         General: No swelling. Normal range of motion.      Cervical back: Normal range of motion and neck supple.   Skin:     General: Skin is warm and dry.   Neurological:      General: No focal deficit present.      Mental Status: She is alert and oriented to person, place, and time. Mental status is at baseline.   Psychiatric:         Mood and Affect: Mood normal.         Behavior: Behavior normal.         Thought Content: Thought content normal.         Judgment: Judgment normal.          Result Review    [facility]          7/19/2025    10:28   CMP   Glucose 97    BUN 5.0    Creatinine 0.74    EGFR 107.0    Sodium 137    Potassium 3.9    Chloride 105    Calcium 9.0    Total Protein 7.4    Albumin 4.3    Globulin 3.1    Total Bilirubin 1.1    Alkaline Phosphatase 49    AST (SGOT) 18    ALT (SGPT) 8    Albumin/Globulin Ratio 1.4    BUN/Creatinine Ratio 6.8    Anion Gap 11.0      CBC w/diff          7/19/2025    10:28   CBC w/Diff   WBC 4.99    RBC 4.54    Hemoglobin 13.7   "  Hematocrit 41.0    MCV 90.3    MCH 30.2    MCHC 33.4    RDW 12.0    Platelets 239    Neutrophil Rel % 57.7    Immature Granulocyte Rel % 0.4    Lymphocyte Rel % 32.9    Monocyte Rel % 6.4    Eosinophil Rel % 2.2    Basophil Rel % 0.4      Lipid Panel          7/19/2025    10:28   Lipid Panel   Total Cholesterol 156    Triglycerides 60    HDL Cholesterol 38    VLDL Cholesterol 12    LDL Cholesterol  106    LDL/HDL Ratio 2.79      TSH          7/19/2025    10:28   TSH   TSH 2.090      No components found for: \"ACRM81FY\"  No components found for: \"PCCGPAXI26\"    No Images in the past 120 days found..     The above data has been reviewed by FERCHO Murphy 07/22/2025 11:03 EDT.          Patient Care Team:  Eri Ferraro APRN as PCP - General (Internal Medicine)            ASSESSMENT & PLAN    Diagnoses and all orders for this visit:    1. Annual physical exam (Primary)  Assessment & Plan:  Recommend monthly SBE, mammograms start at age 40  PAP-patient is scheduled  Recommend age related vaccinations  Wears seat belt.  Recommend regular dental and eye exams, healthy diet and regular exercise.       2. Overweight (BMI 25.0-29.9)  Assessment & Plan:  Patient's (Body mass index is 26.92 kg/m².) indicates that they are overweight with health conditions that include dyslipidemias . Weight is improving with treatment. BMI is above average; BMI management plan is completed. We discussed portion control and increasing exercise.   Continue phentermine    Orders:  -     phentermine 37.5 MG capsule; Take 1 capsule by mouth Every Morning.  Dispense: 30 capsule; Refill: 0    3. Vitamin D deficiency  -     vitamin D (ERGOCALCIFEROL) 1.25 MG (78721 UT) capsule capsule; Take 1 capsule by mouth 1 (One) Time Per Week.  Dispense: 12 capsule; Refill: 3         Assessment & Plan  1. Weight management.  - Lost 16 pounds in 1 month; reports increased energy from medication.  - Blood work indicates slight dehydration; advised to hydrate " adequately.  - Cholesterol levels improving.  - Prescription for phentermine will be provided.    2. Health maintenance.  - Blood work results satisfactory; vitamin D levels within normal range.  - Advised to maintain hydration.  - Prescription for vitamin D will be provided for winter.  - Advised to undergo Pap smear and continue regular dental and eye exams; monthly self-breast exams recommended.    Follow-up: A follow-up visit is scheduled in 1 month.      Tobacco Use: Medium Risk (7/22/2025)    Patient History     Smoking Tobacco Use: Former     Smokeless Tobacco Use: Never     Passive Exposure: Not on file       Follow Up     Return in about 1 month (around 8/22/2025) for Recheck.    Please note that portions of this note were completed with a voice recognition program.    Patient was given instructions and counseling regarding her condition or for health maintenance advice. Please see specific information pulled into the AVS if appropriate.   I have reviewed information obtained and documented by others and I have confirmed the accuracy of this documented note.    FERCHO Mruphy    Patient or patient representative verbalized consent for the use of Ambient Listening during the visit with  FERCHO Murphy for chart documentation. 7/22/2025  11:22 EDT

## 2025-07-22 NOTE — ASSESSMENT & PLAN NOTE
Patient's (Body mass index is 26.92 kg/m².) indicates that they are overweight with health conditions that include dyslipidemias . Weight is improving with treatment. BMI is above average; BMI management plan is completed. We discussed portion control and increasing exercise.   Continue phentermine

## 2025-08-29 ENCOUNTER — OFFICE VISIT (OUTPATIENT)
Dept: INTERNAL MEDICINE | Facility: CLINIC | Age: 40
End: 2025-08-29
Payer: COMMERCIAL

## 2025-08-29 VITALS
HEIGHT: 65 IN | HEART RATE: 65 BPM | OXYGEN SATURATION: 98 % | SYSTOLIC BLOOD PRESSURE: 104 MMHG | BODY MASS INDEX: 25.99 KG/M2 | DIASTOLIC BLOOD PRESSURE: 75 MMHG | WEIGHT: 156 LBS | TEMPERATURE: 97.7 F

## 2025-08-29 DIAGNOSIS — E66.3 OVERWEIGHT (BMI 25.0-29.9): Primary | ICD-10-CM

## 2025-08-29 RX ORDER — PHENTERMINE HYDROCHLORIDE 37.5 MG/1
37.5 CAPSULE ORAL EVERY MORNING
Qty: 30 CAPSULE | Refills: 0 | Status: SHIPPED | OUTPATIENT
Start: 2025-08-29